# Patient Record
Sex: MALE | Race: WHITE | NOT HISPANIC OR LATINO | Employment: FULL TIME | URBAN - NONMETROPOLITAN AREA
[De-identification: names, ages, dates, MRNs, and addresses within clinical notes are randomized per-mention and may not be internally consistent; named-entity substitution may affect disease eponyms.]

---

## 2019-09-14 ENCOUNTER — HOSPITAL ENCOUNTER (EMERGENCY)
Facility: HOSPITAL | Age: 34
Discharge: HOME | End: 2019-09-14
Attending: EMERGENCY MEDICINE | Admitting: EMERGENCY MEDICINE

## 2019-09-14 ENCOUNTER — APPOINTMENT (OUTPATIENT)
Dept: CT IMAGING | Facility: HOSPITAL | Age: 34
End: 2019-09-14

## 2019-09-14 VITALS
HEART RATE: 93 BPM | OXYGEN SATURATION: 96 % | TEMPERATURE: 98 F | DIASTOLIC BLOOD PRESSURE: 87 MMHG | BODY MASS INDEX: 37.35 KG/M2 | HEIGHT: 67 IN | RESPIRATION RATE: 18 BRPM | SYSTOLIC BLOOD PRESSURE: 126 MMHG | WEIGHT: 238 LBS

## 2019-09-14 DIAGNOSIS — R73.09 ELEVATED SERUM GLUCOSE WITH GLUCOSURIA: ICD-10-CM

## 2019-09-14 DIAGNOSIS — R10.9 ACUTE RIGHT FLANK PAIN: Primary | ICD-10-CM

## 2019-09-14 DIAGNOSIS — R81 ELEVATED SERUM GLUCOSE WITH GLUCOSURIA: ICD-10-CM

## 2019-09-14 LAB
ALBUMIN SERPL-MCNC: 4.9 G/DL (ref 3.5–5.2)
ALBUMIN/GLOB SERPL: 1.4 G/DL
ALP SERPL-CCNC: 73 U/L (ref 39–117)
ALT SERPL W P-5'-P-CCNC: 37 U/L (ref 1–41)
AMORPH URATE CRY URNS QL MICRO: ABNORMAL /HPF
ANION GAP SERPL CALCULATED.3IONS-SCNC: 21.4 MMOL/L (ref 5–15)
AST SERPL-CCNC: 35 U/L (ref 1–40)
BACTERIA UR QL AUTO: ABNORMAL /HPF
BASOPHILS # BLD AUTO: 0.05 10*3/MM3 (ref 0–0.2)
BASOPHILS NFR BLD AUTO: 0.7 % (ref 0–1.5)
BILIRUB SERPL-MCNC: 0.5 MG/DL (ref 0.2–1.2)
BILIRUB UR QL STRIP: NEGATIVE
BUN BLD-MCNC: 15 MG/DL (ref 6–20)
BUN/CREAT SERPL: 12 (ref 7–25)
CALCIUM SPEC-SCNC: 10.3 MG/DL (ref 8.6–10.5)
CHLORIDE SERPL-SCNC: 93 MMOL/L (ref 98–107)
CLARITY UR: CLEAR
CO2 SERPL-SCNC: 23.6 MMOL/L (ref 22–29)
COLOR UR: YELLOW
CREAT BLD-MCNC: 1.25 MG/DL (ref 0.76–1.27)
DEPRECATED RDW RBC AUTO: 39.8 FL (ref 37–54)
EOSINOPHIL # BLD AUTO: 0.2 10*3/MM3 (ref 0–0.4)
EOSINOPHIL NFR BLD AUTO: 2.7 % (ref 0.3–6.2)
ERYTHROCYTE [DISTWIDTH] IN BLOOD BY AUTOMATED COUNT: 12.5 % (ref 12.3–15.4)
GFR SERPL CREATININE-BSD FRML MDRD: 67 ML/MIN/1.73
GLOBULIN UR ELPH-MCNC: 3.6 GM/DL
GLUCOSE BLD-MCNC: 246 MG/DL (ref 65–99)
GLUCOSE UR STRIP-MCNC: ABNORMAL MG/DL
HCT VFR BLD AUTO: 46 % (ref 37.5–51)
HGB BLD-MCNC: 15.9 G/DL (ref 13–17.7)
HGB UR QL STRIP.AUTO: ABNORMAL
HOLD SPECIMEN: NORMAL
HYALINE CASTS UR QL AUTO: ABNORMAL /LPF
IMM GRANULOCYTES # BLD AUTO: 0.05 10*3/MM3 (ref 0–0.05)
IMM GRANULOCYTES NFR BLD AUTO: 0.7 % (ref 0–0.5)
KETONES UR QL STRIP: NEGATIVE
LEUKOCYTE ESTERASE UR QL STRIP.AUTO: NEGATIVE
LIPASE SERPL-CCNC: 89 U/L (ref 13–60)
LYMPHOCYTES # BLD AUTO: 2.54 10*3/MM3 (ref 0.7–3.1)
LYMPHOCYTES NFR BLD AUTO: 34.2 % (ref 19.6–45.3)
MCH RBC QN AUTO: 30.2 PG (ref 26.6–33)
MCHC RBC AUTO-ENTMCNC: 34.6 G/DL (ref 31.5–35.7)
MCV RBC AUTO: 87.5 FL (ref 79–97)
MONOCYTES # BLD AUTO: 0.4 10*3/MM3 (ref 0.1–0.9)
MONOCYTES NFR BLD AUTO: 5.4 % (ref 5–12)
MUCOUS THREADS URNS QL MICRO: ABNORMAL /HPF
NEUTROPHILS # BLD AUTO: 4.19 10*3/MM3 (ref 1.7–7)
NEUTROPHILS NFR BLD AUTO: 56.3 % (ref 42.7–76)
NITRITE UR QL STRIP: NEGATIVE
NRBC BLD AUTO-RTO: 0 /100 WBC (ref 0–0.2)
PH UR STRIP.AUTO: 6.5 [PH] (ref 5–8)
PLATELET # BLD AUTO: 434 10*3/MM3 (ref 140–450)
PMV BLD AUTO: 9 FL (ref 6–12)
POTASSIUM BLD-SCNC: 3.4 MMOL/L (ref 3.5–5.2)
PROT SERPL-MCNC: 8.5 G/DL (ref 6–8.5)
PROT UR QL STRIP: ABNORMAL
RBC # BLD AUTO: 5.26 10*6/MM3 (ref 4.14–5.8)
RBC # UR: ABNORMAL /HPF
REF LAB TEST METHOD: ABNORMAL
SODIUM BLD-SCNC: 138 MMOL/L (ref 136–145)
SP GR UR STRIP: 1.02 (ref 1–1.03)
SQUAMOUS #/AREA URNS HPF: ABNORMAL /HPF
UROBILINOGEN UR QL STRIP: ABNORMAL
WBC NRBC COR # BLD: 7.43 10*3/MM3 (ref 3.4–10.8)
WBC UR QL AUTO: ABNORMAL /HPF
WHOLE BLOOD HOLD SPECIMEN: NORMAL
WHOLE BLOOD HOLD SPECIMEN: NORMAL

## 2019-09-14 PROCEDURE — 80053 COMPREHEN METABOLIC PANEL: CPT | Performed by: EMERGENCY MEDICINE

## 2019-09-14 PROCEDURE — 96375 TX/PRO/DX INJ NEW DRUG ADDON: CPT

## 2019-09-14 PROCEDURE — 25010000002 PROMETHAZINE PER 50 MG: Performed by: PHYSICIAN ASSISTANT

## 2019-09-14 PROCEDURE — 81001 URINALYSIS AUTO W/SCOPE: CPT | Performed by: EMERGENCY MEDICINE

## 2019-09-14 PROCEDURE — 83690 ASSAY OF LIPASE: CPT | Performed by: EMERGENCY MEDICINE

## 2019-09-14 PROCEDURE — 85025 COMPLETE CBC W/AUTO DIFF WBC: CPT | Performed by: EMERGENCY MEDICINE

## 2019-09-14 PROCEDURE — 25010000002 HYDROMORPHONE 1 MG/ML SOLUTION: Performed by: EMERGENCY MEDICINE

## 2019-09-14 PROCEDURE — 74176 CT ABD & PELVIS W/O CONTRAST: CPT

## 2019-09-14 PROCEDURE — 99283 EMERGENCY DEPT VISIT LOW MDM: CPT

## 2019-09-14 PROCEDURE — 96374 THER/PROPH/DIAG INJ IV PUSH: CPT

## 2019-09-14 PROCEDURE — 25010000002 MORPHINE PER 10 MG: Performed by: EMERGENCY MEDICINE

## 2019-09-14 RX ORDER — ONDANSETRON 4 MG/1
4 TABLET, ORALLY DISINTEGRATING ORAL EVERY 8 HOURS PRN
Qty: 15 TABLET | Refills: 0 | Status: SHIPPED | OUTPATIENT
Start: 2019-09-14

## 2019-09-14 RX ORDER — ONDANSETRON 2 MG/ML
4 INJECTION INTRAMUSCULAR; INTRAVENOUS ONCE
Status: DISCONTINUED | OUTPATIENT
Start: 2019-09-14 | End: 2019-09-14

## 2019-09-14 RX ORDER — MORPHINE SULFATE 4 MG/ML
4 INJECTION, SOLUTION INTRAMUSCULAR; INTRAVENOUS ONCE
Status: COMPLETED | OUTPATIENT
Start: 2019-09-14 | End: 2019-09-14

## 2019-09-14 RX ORDER — PROMETHAZINE HYDROCHLORIDE 25 MG/ML
12.5 INJECTION, SOLUTION INTRAMUSCULAR; INTRAVENOUS ONCE
Status: COMPLETED | OUTPATIENT
Start: 2019-09-14 | End: 2019-09-14

## 2019-09-14 RX ORDER — SODIUM CHLORIDE 0.9 % (FLUSH) 0.9 %
10 SYRINGE (ML) INJECTION AS NEEDED
Status: DISCONTINUED | OUTPATIENT
Start: 2019-09-14 | End: 2019-09-14 | Stop reason: HOSPADM

## 2019-09-14 RX ADMIN — MORPHINE SULFATE 4 MG: 4 INJECTION INTRAVENOUS at 12:29

## 2019-09-14 RX ADMIN — PROMETHAZINE HYDROCHLORIDE 12.5 MG: 25 INJECTION INTRAMUSCULAR; INTRAVENOUS at 12:29

## 2019-09-14 RX ADMIN — HYDROMORPHONE HYDROCHLORIDE 1 MG: 1 INJECTION, SOLUTION INTRAMUSCULAR; INTRAVENOUS; SUBCUTANEOUS at 13:28

## 2019-09-14 RX ADMIN — SODIUM CHLORIDE 1000 ML: 9 INJECTION, SOLUTION INTRAVENOUS at 12:29

## 2019-09-14 NOTE — ED PROVIDER NOTES
Subjective   34-year-old male patient presents emergency room with complaints of right groin pain and right flank pain.  He has known history of kidney stones.  He is a  and is not from this area.  He presents with elevated blood pressure and heart rate.  He presents in quite a bit of pain.  He states he is allergic to ketorolac and he has swelling as a reaction.  He states Zofran does not help his nausea.  He has had a cystoscopy with bladder stone lithotripsy in the past.  He has a history of hypertension as well and takes medications.  He is from [facility] and is not from the local area.  He denies any burning with urination urinary frequency or difficulty urinating.  He denies any gross blood in his urine. He doesn't state he is a diabetic, however he has glucose in urine.            Review of Systems   Constitutional: Positive for activity change, appetite change and chills. Negative for fever.   HENT: Negative for congestion and sore throat.    Respiratory: Negative for chest tightness and shortness of breath.    Cardiovascular: Negative for chest pain.   Gastrointestinal: Negative for abdominal distention, abdominal pain, nausea and vomiting.   Genitourinary: Positive for flank pain (right). Negative for difficulty urinating, dysuria, hematuria, penile swelling, scrotal swelling and testicular pain.        Right groin pain   Musculoskeletal: Positive for back pain (right lower). Negative for arthralgias, joint swelling, myalgias and neck pain.   Skin: Negative for color change and rash.   Neurological: Negative for dizziness, syncope, weakness and light-headedness.   Hematological: Does not bruise/bleed easily.       Past Medical History:   Diagnosis Date   • Hypertension    • Kidney stone        Allergies   Allergen Reactions   • Toradol [Ketorolac Tromethamine] Swelling       Past Surgical History:   Procedure Laterality Date   • CYSTOSCOPY BLADDER STONE LITHOTRIPSY         History  reviewed. No pertinent family history.    Social History     Socioeconomic History   • Marital status: Single     Spouse name: Not on file   • Number of children: Not on file   • Years of education: Not on file   • Highest education level: Not on file   Tobacco Use   • Smoking status: Never Smoker   Substance and Sexual Activity   • Alcohol use: No     Frequency: Never   • Drug use: No           Objective   Physical Exam   Constitutional: He is oriented to person, place, and time. He appears well-developed and well-nourished. No distress.   HENT:   Head: Normocephalic and atraumatic.   Right Ear: External ear normal.   Left Ear: External ear normal.   Eyes: EOM are normal.   Neck: Normal range of motion. Neck supple.   Cardiovascular: Regular rhythm and intact distal pulses.   Tachycardic upon arrival in the ER. On my evaluation patient's heart rate was under 100.   Pulmonary/Chest: Effort normal and breath sounds normal. No respiratory distress.   Abdominal: Soft. Normal appearance and bowel sounds are normal. There is no tenderness. There is CVA tenderness (right). There is no rigidity and no guarding.   Musculoskeletal: Normal range of motion. He exhibits no edema or deformity.   Neurological: He is alert and oriented to person, place, and time. He has normal strength. He is not disoriented. Coordination and gait normal. GCS eye subscore is 4. GCS verbal subscore is 5. GCS motor subscore is 6.   Skin: Skin is warm, dry and intact. Capillary refill takes less than 2 seconds. No abrasion and no rash noted. No erythema. No pallor.   Psychiatric: He has a normal mood and affect. His behavior is normal.   Nursing note and vitals reviewed.      Procedures           ED Course      Lab Results (last 24 hours)     Procedure Component Value Units Date/Time    CBC & Differential [[id number]] Collected:  09/14/19 1211    Specimen:  Blood Updated:  09/14/19 1219    Narrative:       The following orders were created for panel  order CBC & Differential.  Procedure                               Abnormality         Status                     ---------                               -----------         ------                     CBC Auto Differential[[id number]]        Abnormal            Final result                 Please view results for these tests on the individual orders.    Comprehensive Metabolic Panel [[id number]]  (Abnormal) Collected:  09/14/19 1211    Specimen:  Blood Updated:  09/14/19 1236     Glucose 246 mg/dL      Comment: Glucose >180, Hemoglobin A1C recommended.        BUN 15 mg/dL      Creatinine 1.25 mg/dL      Sodium 138 mmol/L      Potassium 3.4 mmol/L      Chloride 93 mmol/L      CO2 23.6 mmol/L      Calcium 10.3 mg/dL      Total Protein 8.5 g/dL      Albumin 4.90 g/dL      ALT (SGPT) 37 U/L      AST (SGOT) 35 U/L      Alkaline Phosphatase 73 U/L      Total Bilirubin 0.5 mg/dL      eGFR Non African Amer 67 mL/min/1.73      Globulin 3.6 gm/dL      A/G Ratio 1.4 g/dL      BUN/Creatinine Ratio 12.0     Anion Gap 21.4 mmol/L     Narrative:       GFR Normal >60  Chronic Kidney Disease <60  Kidney Failure <15    Lipase [[id number]]  (Abnormal) Collected:  09/14/19 1211    Specimen:  Blood Updated:  09/14/19 1236     Lipase 89 U/L     CBC Auto Differential [[id number]]  (Abnormal) Collected:  09/14/19 1211    Specimen:  Blood Updated:  09/14/19 1219     WBC 7.43 10*3/mm3      RBC 5.26 10*6/mm3      Hemoglobin 15.9 g/dL      Hematocrit 46.0 %      MCV 87.5 fL      MCH 30.2 pg      MCHC 34.6 g/dL      RDW 12.5 %      RDW-SD 39.8 fl      MPV 9.0 fL      Platelets 434 10*3/mm3      Neutrophil % 56.3 %      Lymphocyte % 34.2 %      Monocyte % 5.4 %      Eosinophil % 2.7 %      Basophil % 0.7 %      Immature Grans % 0.7 %      Neutrophils, Absolute 4.19 10*3/mm3      Lymphocytes, Absolute 2.54 10*3/mm3      Monocytes, Absolute 0.40 10*3/mm3      Eosinophils, Absolute 0.20 10*3/mm3      Basophils, Absolute 0.05 10*3/mm3      Immature  Grans, Absolute 0.05 10*3/mm3      nRBC 0.0 /100 WBC     Urinalysis With Microscopic If Indicated (No Culture) - Urine, Clean Catch [[id number]]  (Abnormal) Collected:  09/14/19 1228    Specimen:  Urine, Clean Catch Updated:  09/14/19 1250     Color, UA Yellow     Appearance, UA Clear     pH, UA 6.5     Specific Gravity, UA 1.023     Glucose, UA >=1000 mg/dL (3+)     Ketones, UA Negative     Bilirubin, UA Negative     Blood, UA Small (1+)     Protein, UA Trace     Leuk Esterase, UA Negative     Nitrite, UA Negative     Urobilinogen, UA 0.2 E.U./dL    Urinalysis, Microscopic Only - Urine, Clean Catch [[id number]]  (Abnormal) Collected:  09/14/19 1228    Specimen:  Urine, Clean Catch Updated:  09/14/19 1301     RBC, UA 13-20 /HPF      WBC, UA 0-2 /HPF      Bacteria, UA None Seen /HPF      Squamous Epithelial Cells, UA 0-2 /HPF      Hyaline Casts, UA None Seen /LPF      Amorphous Crystals, UA Small/1+ /HPF      Mucus, UA Small/1+ /HPF      Methodology Manual Light Microscopy          CT Abdomen Pelvis Stone Protocol   Order: [id number]   Status:  Final result   Visible to patient:  No (Not Released) Next appt:  None   Details     Reading Physician Reading Date Result Priority   Franklin Mauro MD 9/14/2019       Narrative     PROCEDURE: CT ABDOMEN PELVIS STONE PROTOCOL-     HISTORY: Flank pain, stone disease suspected     COMPARISON: None .     PROCEDURE: Axial images were obtained from the lung bases through the  pubic symphysis without intravenous contrast. .      FINDINGS:      ABDOMEN: The lung bases are clear. The heart size is normal. The limited  noncontrast images of the liver demonstrates diffuse hypodensity of the  liver consistent with fatty infiltration. The spleen is normal. No  adrenal masses are seen.  The pancreas has an unremarkable unenhanced  appearance.. The aorta is normal in caliber. There is no significant  free fluid or adenopathy.  There is a small nonobstructing stone at the  inferior  pole of the right kidney. There is no hydronephrosis. Limited  noncontrast images of the bowel are unremarkable.     PELVIS: The appendix is normal. The urinary bladder is unremarkable.  There is no significant fluid or adenopathy.         Impression     Small nonobstructing right renal stone with no  hydronephrosis or ureterolithiasis.              1173.37 mGy.cm.  22.75 mGy     This study was performed with techniques to keep radiation doses as low  as reasonably achievable (ALARA). Individualized dose reduction  techniques using automated exposure control or adjustment of mA and/or  kV according to the patient size were employed.      This report was finalized on 9/14/2019 1:57 PM by Franklin Mauro M.D..           2:08 PM-the patient does not appear to have any ureteral stone.  He does have a small nonobstructing right renal stone but no hydronephrosis or ureterolithiasis.  I will discharge the patient home with Zofran.  He does have elevated glucose at 246.  He has glucosuria.  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  Potassium slightly low he can increase potassium in his diet.    I have reviewed all labs, and all imaging that has been ordered for this patient.  I have discussed the results of this testing with the patient.  Together the patient, their family and I discussed the plan for treatment and disposition.      Red flags, indicating immediate need to return to the emergency room, were discussed with the patient and/or the patient's family and they verbalized understanding.  The patient and/or the family were encouraged to return to the emergency room for any worsening or concerning symptoms.            MDM    Final diagnoses:   Acute right flank pain   Elevated serum glucose with glucosuria              Marija Harrison PA-C  09/14/19 [id number]

## 2019-10-07 ENCOUNTER — HOSPITAL ENCOUNTER (EMERGENCY)
Age: 34
Discharge: LEFT BEFORE DISCHARGE | End: 2019-10-07

## 2023-05-03 PROBLEM — F41.1 GAD (GENERALIZED ANXIETY DISORDER): Status: ACTIVE | Noted: 2023-05-03

## 2023-05-04 ENCOUNTER — SOCIAL WORK (OUTPATIENT)
Dept: ADMINISTRATIVE | Facility: OTHER | Age: 38
End: 2023-05-04

## 2023-05-04 NOTE — PROGRESS NOTES
Sw received a consult to assist with counseling. Sw called and spoke to Patient ([phone]). He is agreeable to counseling. Ron faxed the referral to [facility] to review.    Alondra Galloway LCSW    [phone]

## 2023-05-09 ENCOUNTER — SOCIAL WORK (OUTPATIENT)
Dept: ADMINISTRATIVE | Facility: OTHER | Age: 38
End: 2023-05-09

## 2023-05-09 NOTE — PROGRESS NOTES
Sw received a message from Jaiden at [facility]. Patient has been scheduled an assessment.    [facility]  [address] [place], [place]  [phone]  Appt:  5/15/2023 at 2:00 pm with DEYANIRA Anderson LCSW    [phone]

## 2023-06-13 PROBLEM — E11.65 TYPE 2 DIABETES MELLITUS WITH HYPERGLYCEMIA, WITH LONG-TERM CURRENT USE OF INSULIN: Status: ACTIVE | Noted: 2023-06-13

## 2023-06-13 PROBLEM — Z79.4 TYPE 2 DIABETES MELLITUS WITH HYPERGLYCEMIA, WITH LONG-TERM CURRENT USE OF INSULIN: Status: ACTIVE | Noted: 2023-06-13

## 2023-07-13 PROBLEM — G43.919 INTRACTABLE MIGRAINE WITHOUT STATUS MIGRAINOSUS: Status: ACTIVE | Noted: 2023-07-13

## 2023-07-13 PROBLEM — G47.33 OSA (OBSTRUCTIVE SLEEP APNEA): Status: ACTIVE | Noted: 2023-07-13

## 2023-07-13 PROBLEM — R53.83 FATIGUE: Status: ACTIVE | Noted: 2023-07-13

## 2023-07-13 PROBLEM — G47.8 NON-RESTORATIVE SLEEP: Status: ACTIVE | Noted: 2023-07-13

## 2023-07-13 PROBLEM — R46.89 AGGRESSIVE BEHAVIOR: Status: ACTIVE | Noted: 2023-07-13

## 2023-07-13 PROBLEM — R06.81 WITNESSED EPISODE OF APNEA: Status: ACTIVE | Noted: 2023-07-13

## 2023-07-13 PROBLEM — I10 PRIMARY HYPERTENSION: Status: ACTIVE | Noted: 2023-07-13

## 2023-07-13 PROBLEM — G47.19 EXCESSIVE DAYTIME SLEEPINESS: Status: ACTIVE | Noted: 2023-07-13

## 2023-07-13 PROBLEM — F51.04 CHRONIC INSOMNIA: Status: ACTIVE | Noted: 2023-07-13

## 2023-07-13 PROBLEM — F12.10 MARIJUANA ABUSE, CONTINUOUS: Status: ACTIVE | Noted: 2023-07-13

## 2023-07-13 PROBLEM — R06.83 SNORING: Status: ACTIVE | Noted: 2023-07-13

## 2023-07-13 PROBLEM — G25.81 RLS (RESTLESS LEGS SYNDROME): Status: ACTIVE | Noted: 2023-07-13

## 2023-07-19 PROBLEM — J45.909 ASTHMA: Status: ACTIVE | Noted: 2023-07-19

## 2023-07-21 PROBLEM — R06.83 PRIMARY SNORING: Status: ACTIVE | Noted: 2023-07-21

## 2023-07-21 PROBLEM — G47.30 SLEEP-DISORDERED BREATHING: Status: ACTIVE | Noted: 2023-07-21

## 2023-10-12 ENCOUNTER — PATIENT MESSAGE (OUTPATIENT)
Dept: ADMINISTRATIVE | Facility: OTHER | Age: 38
End: 2023-10-12

## 2023-10-20 PROBLEM — J45.50 SEVERE PERSISTENT ASTHMA WITHOUT COMPLICATION: Chronic | Status: ACTIVE | Noted: 2023-10-20

## 2023-10-20 PROBLEM — J45.909 ASTHMA: Status: RESOLVED | Noted: 2023-07-19 | Resolved: 2023-10-20

## 2023-10-20 PROBLEM — F17.201 TOBACCO USE DISORDER, MILD, IN SUSTAINED REMISSION: Chronic | Status: ACTIVE | Noted: 2023-10-20

## 2023-11-02 ENCOUNTER — PATIENT MESSAGE (OUTPATIENT)
Dept: ADMINISTRATIVE | Facility: OTHER | Age: 38
End: 2023-11-02

## 2023-11-19 PROBLEM — T43.605A: Status: ACTIVE | Noted: 2023-11-19

## 2023-12-06 ENCOUNTER — PATIENT MESSAGE (OUTPATIENT)
Dept: ADMINISTRATIVE | Facility: OTHER | Age: 38
End: 2023-12-06

## 2024-01-30 PROBLEM — E10.9 TYPE 1 DIABETES MELLITUS WITHOUT COMPLICATION: Status: ACTIVE | Noted: 2023-06-13

## 2024-01-30 PROBLEM — Z96.41 INSULIN PUMP IN PLACE: Status: ACTIVE | Noted: 2024-01-30
